# Patient Record
Sex: FEMALE | Race: WHITE | NOT HISPANIC OR LATINO | Employment: FULL TIME | URBAN - METROPOLITAN AREA
[De-identification: names, ages, dates, MRNs, and addresses within clinical notes are randomized per-mention and may not be internally consistent; named-entity substitution may affect disease eponyms.]

---

## 2018-10-10 ENCOUNTER — TELEPHONE (OUTPATIENT)
Dept: OBSTETRICS AND GYNECOLOGY | Facility: CLINIC | Age: 38
End: 2018-10-10

## 2018-10-10 DIAGNOSIS — Z12.39 BREAST CANCER SCREENING: Primary | ICD-10-CM

## 2018-10-10 NOTE — TELEPHONE ENCOUNTER
----- Message from Danelle Paige MA sent at 10/10/2018  9:47 AM CDT -----  Contact: Self  Lilly Ambrose  MRN: [id number]  Home Phone      [phone]  Work Phone      Not on file.  Mobile          [phone]    Patient Care Team:  Ezio Delgadillo MD as PCP - General (Family Medicine)  OB? No  What phone number can you be reached at? [phone]  Message: Please place and link mammogram orders to appointment scheduled on 12/7/2018.

## 2018-12-21 ENCOUNTER — HOSPITAL ENCOUNTER (OUTPATIENT)
Dept: RADIOLOGY | Facility: HOSPITAL | Age: 38
Discharge: HOME | End: 2018-12-21
Attending: OBSTETRICS & GYNECOLOGY
Payer: COMMERCIAL

## 2018-12-21 VITALS — WEIGHT: 182 LBS | BODY MASS INDEX: 26.96 KG/M2 | HEIGHT: 69 IN

## 2018-12-21 DIAGNOSIS — Z12.39 BREAST CANCER SCREENING: ICD-10-CM

## 2018-12-21 PROCEDURE — 77063 BREAST TOMOSYNTHESIS BI: CPT | Mod: TC

## 2018-12-21 PROCEDURE — 77067 SCR MAMMO BI INCL CAD: CPT | Mod: 26,,, | Performed by: RADIOLOGY

## 2018-12-21 PROCEDURE — 77063 BREAST TOMOSYNTHESIS BI: CPT | Mod: 26,,, | Performed by: RADIOLOGY

## 2018-12-21 PROCEDURE — 77067 SCR MAMMO BI INCL CAD: CPT | Mod: TC

## 2018-12-31 ENCOUNTER — OFFICE VISIT (OUTPATIENT)
Dept: OBSTETRICS AND GYNECOLOGY | Facility: CLINIC | Age: 38
End: 2018-12-31
Payer: COMMERCIAL

## 2018-12-31 VITALS
RESPIRATION RATE: 10 BRPM | DIASTOLIC BLOOD PRESSURE: 90 MMHG | WEIGHT: 202 LBS | HEART RATE: 68 BPM | BODY MASS INDEX: 29.92 KG/M2 | HEIGHT: 69 IN | SYSTOLIC BLOOD PRESSURE: 130 MMHG

## 2018-12-31 DIAGNOSIS — Z01.419 WELL WOMAN EXAM WITH ROUTINE GYNECOLOGICAL EXAM: Primary | ICD-10-CM

## 2018-12-31 PROCEDURE — 99396 PREV VISIT EST AGE 40-64: CPT | Mod: S$GLB,,, | Performed by: OBSTETRICS & GYNECOLOGY

## 2018-12-31 PROCEDURE — 99999 PR PBB SHADOW E&M-EST. PATIENT-LVL III: CPT | Mod: PBBFAC,,, | Performed by: OBSTETRICS & GYNECOLOGY

## 2018-12-31 RX ORDER — LOSARTAN POTASSIUM AND HYDROCHLOROTHIAZIDE 12.5; 5 MG/1; MG/1
1 TABLET ORAL 2 TIMES DAILY
COMMUNITY
Start: 2018-12-12

## 2018-12-31 RX ORDER — LANSOPRAZOLE 30 MG/1
CAPSULE, DELAYED RELEASE ORAL
COMMUNITY
Start: 2018-12-28 | End: 2023-08-16

## 2018-12-31 RX ORDER — ESCITALOPRAM OXALATE 10 MG/1
10 TABLET ORAL DAILY
COMMUNITY
Start: 2018-12-12

## 2018-12-31 NOTE — PROGRESS NOTES
Subjective:    Patient ID: Lilly Ambrose is a 38 y.o. female.     Chief Complaint: Annual Well Woman Exam     History of Present Illness:  Lilly presents today for Annual Well Woman exam. .Patient's last menstrual period was 2010.. She is currently using no hormone replacement therapy and she reports no problems with hot flashes, night sweats, irritability and vaginal dryness. She denies breast tenderness, masses, nipple discharge.  She reports no problems with urination. Bowel movements have not significantly changed.    Menstrual History:   Patient's last menstrual period was 2010.    OB History      Para Term  AB Living    2 2 2     2    SAB TAB Ectopic Multiple Live Births                       Review of Systems   Constitutional: Negative for activity change, appetite change, chills, diaphoresis, fatigue, fever and unexpected weight change.   HENT: Negative for mouth sores and tinnitus.    Eyes: Negative for discharge and visual disturbance.   Respiratory: Negative for cough, shortness of breath and wheezing.    Cardiovascular: Negative for chest pain, palpitations and leg swelling.   Gastrointestinal: Negative for abdominal pain, blood in stool, constipation, diarrhea, nausea and vomiting.   Endocrine: Negative for diabetes, hair loss, hot flashes, hyperthyroidism and hypothyroidism.   Genitourinary: Negative for decreased libido, dyspareunia, dysuria, flank pain, frequency, genital sores, hematuria, menorrhagia, menstrual problem, pelvic pain, urgency, vaginal bleeding, vaginal discharge, vaginal pain, urinary incontinence, postcoital bleeding and vaginal odor.   Musculoskeletal: Negative for back pain, joint swelling and myalgias.   Neurological: Negative for seizures, syncope, numbness and headaches.   Hematological: Negative for adenopathy. Does not bruise/bleed easily.   Psychiatric/Behavioral: Negative for sleep disturbance. The patient is not nervous/anxious.    Breast:  Negative for mastodynia and nipple discharge        Objective:    Vital Signs:  Vitals:    12/31/18 0803   BP: (!) 130/90   Pulse: 68   Resp: 10       Physical Exam:  General:  alert,normal appearing gravid female   Skin:  Skin color, texture, turgor normal. No rashes or lesions   HEENT:  conjunctivae/corneas clear. PERRL.   Neck: supple, trachea midline, no adenopathy or thyromegally   Respiratory:  clear to auscultation bilaterally   Heart:  regular rate and rhythm, S1, S2 normal, no murmur, click, rub or gallop   Breasts:   Nipples are protruding and have no nipple discharge. No palpable masses, erythema, skin changes, tenderness, or adenopathy.   Abdomen:  soft, non-tender. Bowel sounds normal. No masses,  no organomegaly   Pelvis: External genitalia: normal general appearance  Urinary system: urethral meatus normal, bladder nontender  Vaginal: normal mucosa without prolapse or lesions  Cervix: removed surgically  Uterus: removed surgically  Adnexa: removed surgically   Extremities: Normal ROM; no edema, no cyanosis   Neurologial: Normal strength and tone. No focal numbness or weakness. Reflexes 2+ and equal.   Psychiatric: normal mood, speech, dress, and thought processes         Assessment:      1. Well woman exam with routine gynecological exam          Plan:      Well woman exam with routine gynecological exam        COUNSELING:  Lilly was counseled on A.C.O.G. Pap guidelines and recommendations for yearly pelvic exams in addition to recommendations for yearly mammograms and monthly self breast exams. In addition she was counseled on adequate intake of calcium and vitamin D; to see her PCP for other health maintenance.

## 2021-07-13 ENCOUNTER — TELEPHONE (OUTPATIENT)
Dept: OBSTETRICS AND GYNECOLOGY | Facility: CLINIC | Age: 41
End: 2021-07-13

## 2021-07-13 DIAGNOSIS — Z12.31 BREAST CANCER SCREENING BY MAMMOGRAM: Primary | ICD-10-CM

## 2023-07-14 ENCOUNTER — TELEPHONE (OUTPATIENT)
Dept: OBSTETRICS AND GYNECOLOGY | Facility: CLINIC | Age: 43
End: 2023-07-14
Payer: COMMERCIAL

## 2023-07-14 DIAGNOSIS — Z12.31 BREAST CANCER SCREENING BY MAMMOGRAM: Primary | ICD-10-CM

## 2023-07-14 NOTE — TELEPHONE ENCOUNTER
Patient contacted office requesting and appointment with Dr. Marie for annual, and need mammogram scheduled. Patient is scheduled for 09/01/2023 @12:15 for her annual and mammo is scheduled for 09/01/2023 @11:25am. Patient verbally understood and orders are placed/linked.

## 2023-08-16 ENCOUNTER — OFFICE VISIT (OUTPATIENT)
Dept: GASTROENTEROLOGY | Facility: CLINIC | Age: 43
End: 2023-08-16
Payer: COMMERCIAL

## 2023-08-16 VITALS
DIASTOLIC BLOOD PRESSURE: 80 MMHG | HEIGHT: 69 IN | HEART RATE: 88 BPM | BODY MASS INDEX: 34.26 KG/M2 | OXYGEN SATURATION: 98 % | WEIGHT: 231.31 LBS | SYSTOLIC BLOOD PRESSURE: 120 MMHG

## 2023-08-16 DIAGNOSIS — Z12.11 SCREEN FOR COLON CANCER: ICD-10-CM

## 2023-08-16 DIAGNOSIS — K21.9 GASTROESOPHAGEAL REFLUX DISEASE, UNSPECIFIED WHETHER ESOPHAGITIS PRESENT: Primary | ICD-10-CM

## 2023-08-16 DIAGNOSIS — R13.14 PHARYNGOESOPHAGEAL DYSPHAGIA: ICD-10-CM

## 2023-08-16 DIAGNOSIS — R19.7 DIARRHEA, UNSPECIFIED TYPE: ICD-10-CM

## 2023-08-16 PROCEDURE — 99204 PR OFFICE/OUTPT VISIT, NEW, LEVL IV, 45-59 MIN: ICD-10-PCS | Mod: S$GLB,,, | Performed by: INTERNAL MEDICINE

## 2023-08-16 PROCEDURE — 99999 PR PBB SHADOW E&M-EST. PATIENT-LVL IV: ICD-10-PCS | Mod: PBBFAC,,, | Performed by: INTERNAL MEDICINE

## 2023-08-16 PROCEDURE — 99999 PR PBB SHADOW E&M-EST. PATIENT-LVL IV: CPT | Mod: PBBFAC,,, | Performed by: INTERNAL MEDICINE

## 2023-08-16 PROCEDURE — 99204 OFFICE O/P NEW MOD 45 MIN: CPT | Mod: S$GLB,,, | Performed by: INTERNAL MEDICINE

## 2023-08-16 RX ORDER — OMEGA-3-ACID ETHYL ESTERS 1 G/1
2 CAPSULE, LIQUID FILLED ORAL
COMMUNITY
Start: 2023-05-16 | End: 2023-08-16 | Stop reason: SDUPTHER

## 2023-08-16 RX ORDER — SODIUM, POTASSIUM,MAG SULFATES 17.5-3.13G
1 SOLUTION, RECONSTITUTED, ORAL ORAL DAILY
Qty: 1 KIT | Refills: 0 | Status: SHIPPED | OUTPATIENT
Start: 2023-08-16 | End: 2023-08-18

## 2023-08-16 RX ORDER — OMEGA-3-ACID ETHYL ESTERS 1 G/1
2 CAPSULE, LIQUID FILLED ORAL 2 TIMES DAILY
COMMUNITY
Start: 2023-07-28

## 2023-08-16 RX ORDER — LOSARTAN POTASSIUM AND HYDROCHLOROTHIAZIDE 12.5; 5 MG/1; MG/1
1 TABLET ORAL 2 TIMES DAILY
COMMUNITY
Start: 2023-08-10 | End: 2023-08-16 | Stop reason: SDUPTHER

## 2023-08-16 RX ORDER — CHOLECALCIFEROL (VITAMIN D3) 50 MCG
1 TABLET ORAL DAILY
COMMUNITY

## 2023-08-16 RX ORDER — AMLODIPINE BESYLATE 10 MG/1
10 TABLET ORAL
COMMUNITY
Start: 2023-08-14

## 2023-08-16 RX ORDER — ROSUVASTATIN CALCIUM 20 MG/1
1 TABLET, COATED ORAL DAILY
COMMUNITY
Start: 2023-03-21 | End: 2023-08-16 | Stop reason: SDUPTHER

## 2023-08-16 RX ORDER — ESCITALOPRAM OXALATE 10 MG/1
1 TABLET ORAL DAILY
COMMUNITY
Start: 2023-05-10 | End: 2023-08-16 | Stop reason: SDUPTHER

## 2023-08-16 RX ORDER — PANTOPRAZOLE SODIUM 40 MG/1
40 TABLET, DELAYED RELEASE ORAL DAILY
Qty: 90 TABLET | Refills: 3 | Status: SHIPPED | OUTPATIENT
Start: 2023-08-16 | End: 2024-08-15

## 2023-08-16 RX ORDER — ROSUVASTATIN CALCIUM 20 MG/1
20 TABLET, COATED ORAL NIGHTLY
COMMUNITY
Start: 2023-08-14

## 2023-08-16 RX ORDER — AMLODIPINE BESYLATE 10 MG/1
1 TABLET ORAL DAILY
COMMUNITY
Start: 2023-08-10 | End: 2023-08-16 | Stop reason: SDUPTHER

## 2023-08-16 NOTE — PATIENT INSTRUCTIONS
SUPREP Instructions    PLEASE FOLLOW THESE INSTRUCTIONS NOT THE INSTRUCTIONS ON THE BOX    You are scheduled for a colonoscopy with Dr. Quezada on Friday, September 8 at [facility] located at [address] in [place].  Enter through the South Entrance #1 (by the ED).  Go and check-in at Same Day Surgery.      You will receive a call 1-2 days before your colonoscopy to tell you the time to arrive.  If you have not received a call by the day before your procedure, call the Endoscopy Lab at [phone].    To ensure that your test is accurate and complete, you MUST follow these instructions listed below.  If you have any questions, please call our office at [phone].  Plan on being at the hospital for your procedure for 3-4 hours.    1.  Follow a CLEAR LIQUID DIET for the entire day before your scheduled colonoscopy.  This means no solid food the entire day starting when you wake.  You may have as much of the clear liquids as you want throughout the day.   CLEAR LIQUID DIET:   - NO DAIRY   - You can have:  Coffee with sugar (no creamer), tea, water, soda, apple or white grape juice, chicken or beef broth/bouillon (no meat, noodles, or veggies), green/yellow popsicles, green/yellow Jell-O, lemonade.    2.  AT 5 pm the evening before your colonoscopy, POUR ONE (1) BOTTLE OF SUPREP INTO THE MIXING CONTAINER, PROVIDED INSIDE THE BOX.  ADD WATER TO THE LINE ON THE CONTAINER AND MIX IT WELL.  DRINK THE ENTIRE CONTAINER AND THEN DRINK TWO (2) MORE CONTAINERS OF WATER OVER THE NEXT 1 HOUR.  This is sometimes easier to drink if this solution is cold, so you can mix the solution 20 minutes ahead of time and place in the refrigerator prior to drinking.  You have to drink the solution within 30-45 minutes of mixing it.  Do NOT put this solution over ice.  It IS ok to drink with a straw.    3.  The endoscopy department will call you 1 day before your colonoscopy to tell you the exact time to arrive,  AND to tell you the exact time to drink the 2nd portion of your prep (which will be FIVE HOURS BEFORE YOUR ARRIVAL TIME).  At this time given to you, POUR ONE (1) BOTTLE OF SUPREP INTO THE MIXING CONTAINER, PROVIDED INSIDE THE BOX.  ADD WATER TO THE LINE ON THE CONTAINER AND MIX IT WELL.  DRINK THE ENTIRE CONTAINER AND THEN DRINK TWO (2) MORE CONTAINERS OF WATER OVER THE NEXT 1 HOUR.  This is sometimes easier to drink if this solution is cold, so you can mix the solution 20 minutes ahead of time and place in the refrigerator prior to drinking.  You have to drink the solution within 30-45 minutes of mixing it.  Do NOT put this solution over ice.  It IS ok to drink with a straw.  Once this is complete, you may not have ANYTHING else by mouth!    4.  You must have someone with you to DRIVE YOU HOME since you will be receiving IV sedation for the colonoscopy.    5.  It is ok to take MOST of your REGULAR MEDICATIONS  in the morning of your test with a SIP of water.  THE ONLY MEDS YOU NEED TO HOLD ARE YOUR DIABETES MEDICATIONS,  SOME BLOOD PRESSURE MEDS, AND BLOOD THINNERS IF OK'D BY YOUR DOCTOR.  Do NOT have anything else to eat or drink the morning of your colonoscopy.  It is ok to brush your teeth.    6.  If you are on blood thinners THAT YOU HAVE BEEN INSTRUCTED TO HOLD BY YOUR DOCTOR FOR THIS PROCEDURE, then do NOT take this the morning of your colonoscopy.  Do NOT stop these medications on your own, they must be approved to be held by your doctor.  Your colonoscopy can NOT be done if you are on these medications.  Examples of blood thinners include: Coumadin, Aggrenox, Plavix, Pradaxa, Reapro, Pletal, Xarelto, Ticagrelor, Brilinta, Eliquis, and high dose aspirin (325 mg).  You do not have to stop baby aspirin 81 mg.    7.  IF YOU ARE DIABETIC:  NO INSULIN OR ORAL MEDICATIONS THE MORNING OF THE COLONOSCOPY.  TAKE ONLY HALF THE DOSE OF YOUR INSULIN THE DAY BEFORE THE COLONOSCOPY.  DO NOT TAKE ANY ORAL DIABETIC  MEDICATIONS THE DAY BEFORE THE COLONOSCOPY.  IF YOU ARE AN INSULIN DEPENDENT DIABETIC WITH UNSTABLE BLOOD SUGARS, NOTIFY YOUR PRIMARY CARE PHYSICIAN FOR INSTRUCTIONS.

## 2023-08-19 NOTE — PROGRESS NOTES
"Subjective     Patient ID: Lilly Ambrose is a 43 y.o. female.    Chief Complaint: Heartburn, Other, and Diarrhea    44 yo F complains of GERD, dysphagia, and diarrhea.  She describes her GERD as being a burning sensation at the suprasternal notch only; she does not feel burning coming up from her stomach.  She has a globus sensation in that location and has a cough associated with this.  This usually only bothers her when eating and drinking, and water alone can cause symptoms, but it really occurs randomly with variable timing.  She has been taking Zegerid after the symptoms occur with moderate results, but the episodes still occur.  She complains of "diarrhea for years" that occurs 2-7 times per day, and she does not have days without BM.  She denies seeing blood.  The BM vary and the inconsistency seems random, is not dependent on diet.  She quit alcohol abuse one month ago ("cold turkey") and stools are now a little more formed at times but she still has bad days.  Her maternal grandmother  from CRC at age 93; her mom has not had polyps, her siblings do not have CRC but she is unsure if they have had polyps.      Review of Systems   Constitutional:  Negative for chills and fever.   HENT:  Positive for trouble swallowing.    Eyes:  Negative for photophobia and visual disturbance.   Respiratory:  Negative for shortness of breath and wheezing.    Cardiovascular:  Negative for chest pain, palpitations and leg swelling.   Gastrointestinal:  Positive for diarrhea and reflux. Negative for blood in stool.   Genitourinary:  Negative for dysuria and hematuria.   Musculoskeletal:  Negative for joint swelling and myalgias.   Integumentary:  Negative for color change and rash.   Neurological:  Negative for dizziness and speech difficulty.   Psychiatric/Behavioral:  Negative for confusion and hallucinations.       Objective   /80 (BP Location: Left arm, Patient Position: Sitting, BP Method: Large (Manual))   Pulse " "88   Ht 5' 9" (1.753 m)   Wt 104.9 kg (231 lb 4.8 oz)   LMP 08/21/2010   SpO2 98%   BMI 34.16 kg/m²     Physical Exam  Constitutional:       Appearance: Normal appearance. She is well-developed.   HENT:      Head: Normocephalic and atraumatic.   Eyes:      Extraocular Movements: Extraocular movements intact.      Pupils: Pupils are equal, round, and reactive to light.   Pulmonary:      Effort: Pulmonary effort is normal. No respiratory distress.   Abdominal:      General: There is no distension.      Palpations: Abdomen is soft.   Musculoskeletal:         General: No signs of injury. Normal range of motion.      Cervical back: Normal range of motion and neck supple.   Neurological:      General: No focal deficit present.      Mental Status: She is alert and oriented to person, place, and time.   Psychiatric:         Mood and Affect: Mood normal.         Behavior: Behavior normal.         Thought Content: Thought content normal.         Judgment: Judgment normal.        Assessment and Plan     1. Gastroesophageal reflux disease, unspecified whether esophagitis present  -     Case Request Endoscopy: EGD (ESOPHAGOGASTRODUODENOSCOPY), COLONOSCOPY  -     pantoprazole (PROTONIX) 40 MG tablet; Take 1 tablet (40 mg total) by mouth once daily.  Dispense: 90 tablet; Refill: 3    2. Diarrhea, unspecified type  -     X-Ray Abdomen Flat And Erect; Future; Expected date: 08/16/2023  -     Clostridium difficile EIA; Future; Expected date: 08/16/2023  -     Electrolyte and Osmolality Panel, Feces Random; Future; Expected date: 08/16/2023  -     Fecal fat, qualitative; Future; Expected date: 08/16/2023  -     Giardia / Cryptosporidum, EIA; Future; Expected date: 08/16/2023  -     Pancreatic elastase, fecal; Future; Expected date: 08/16/2023  -     WBC, Stool; Future; Expected date: 08/16/2023    3. Pharyngoesophageal dysphagia  -     pantoprazole (PROTONIX) 40 MG tablet; Take 1 tablet (40 mg total) by mouth once daily.  " Dispense: 90 tablet; Refill: 3  -     Fl Modified Barium Swallow Speech; Future; Expected date: 08/16/2023  -     SLP video swallow; Future; Expected date: 08/16/2023    4. Screen for colon cancer  -     Case Request Endoscopy: EGD (ESOPHAGOGASTRODUODENOSCOPY), COLONOSCOPY  -     sodium,potassium,mag sulfates (SUPREP BOWEL PREP KIT) 17.5-3.13-1.6 gram SolR; Take 177 mLs by mouth once daily. for 2 days  Dispense: 1 kit; Refill: 0

## 2023-08-21 ENCOUNTER — TELEPHONE (OUTPATIENT)
Dept: GASTROENTEROLOGY | Facility: CLINIC | Age: 43
End: 2023-08-21
Payer: COMMERCIAL

## 2023-08-21 NOTE — TELEPHONE ENCOUNTER
Contacted pt to inform about XRAY results. Informed pt no significant stool was noted. Continue taking pantoprazole and continue EGD and colonoscopy planned. Pt stated she turned in stool this morning. Informed pt I left a voicemail for her to get scheduled for her speech study. Informed pt someone will call her to get that scheduled. Pt had verbal understanding. No questions by pt.           ----- Message from Laura Quezada MD sent at 8/19/2023 10:38 AM CDT -----  No significant stool burden noted, continue with our plan from clinic.  Please call with results, she is not sure how to use portal

## 2023-09-01 ENCOUNTER — OFFICE VISIT (OUTPATIENT)
Dept: OBSTETRICS AND GYNECOLOGY | Facility: CLINIC | Age: 43
End: 2023-09-01
Payer: COMMERCIAL

## 2023-09-01 ENCOUNTER — HOSPITAL ENCOUNTER (OUTPATIENT)
Dept: RADIOLOGY | Facility: HOSPITAL | Age: 43
Discharge: HOME | End: 2023-09-01
Attending: OBSTETRICS & GYNECOLOGY
Payer: COMMERCIAL

## 2023-09-01 VITALS
RESPIRATION RATE: 18 BRPM | DIASTOLIC BLOOD PRESSURE: 82 MMHG | HEART RATE: 117 BPM | OXYGEN SATURATION: 98 % | SYSTOLIC BLOOD PRESSURE: 126 MMHG | BODY MASS INDEX: 34.85 KG/M2 | WEIGHT: 235.31 LBS | HEIGHT: 69 IN

## 2023-09-01 VITALS — BODY MASS INDEX: 34.21 KG/M2 | WEIGHT: 231 LBS | HEIGHT: 69 IN

## 2023-09-01 DIAGNOSIS — Z12.31 BREAST CANCER SCREENING BY MAMMOGRAM: ICD-10-CM

## 2023-09-01 DIAGNOSIS — Z01.419 WELL WOMAN EXAM WITH ROUTINE GYNECOLOGICAL EXAM: Primary | ICD-10-CM

## 2023-09-01 DIAGNOSIS — Z90.710 HISTORY OF HYSTERECTOMY: ICD-10-CM

## 2023-09-01 DIAGNOSIS — Z12.11 COLON CANCER SCREENING: ICD-10-CM

## 2023-09-01 PROCEDURE — 77067 MAMMO DIGITAL SCREENING BILAT WITH TOMO: ICD-10-PCS | Mod: 26,,, | Performed by: RADIOLOGY

## 2023-09-01 PROCEDURE — 99999 PR PBB SHADOW E&M-EST. PATIENT-LVL III: ICD-10-PCS | Mod: PBBFAC,,, | Performed by: OBSTETRICS & GYNECOLOGY

## 2023-09-01 PROCEDURE — 99386 PREV VISIT NEW AGE 40-64: CPT | Mod: S$GLB,,, | Performed by: OBSTETRICS & GYNECOLOGY

## 2023-09-01 PROCEDURE — 77063 MAMMO DIGITAL SCREENING BILAT WITH TOMO: ICD-10-PCS | Mod: 26,,, | Performed by: RADIOLOGY

## 2023-09-01 PROCEDURE — 99386 PR PREVENTIVE VISIT,NEW,40-64: ICD-10-PCS | Mod: S$GLB,,, | Performed by: OBSTETRICS & GYNECOLOGY

## 2023-09-01 PROCEDURE — 77063 BREAST TOMOSYNTHESIS BI: CPT | Mod: 26,,, | Performed by: RADIOLOGY

## 2023-09-01 PROCEDURE — 99999 PR PBB SHADOW E&M-EST. PATIENT-LVL III: CPT | Mod: PBBFAC,,, | Performed by: OBSTETRICS & GYNECOLOGY

## 2023-09-01 PROCEDURE — 77067 SCR MAMMO BI INCL CAD: CPT | Mod: TC

## 2023-09-01 PROCEDURE — 77067 SCR MAMMO BI INCL CAD: CPT | Mod: 26,,, | Performed by: RADIOLOGY

## 2023-09-01 NOTE — PROGRESS NOTES
Subjective:    Patient ID: Lilly Ambrose is a 43 y.o. y.o. female.     Chief Complaint: Annual Well Woman Exam     History of Present Illness:  Lilly presents today for Annual Well Woman exam. She describes her menses as  absent s/p hysterectomy  .She denies pelvic pain.  She denies breast tenderness, masses, nipple discharge. She denies difficulty with urination or bowel movements. She denies menopausal symptoms such as hotflashes, vaginal dryness, and night sweats. She denies bloating, early satiety, or weight changes. She is sexually active. Contraception is by no method.    The following portions of the patient's history were reviewed and updated as appropriate: allergies, current medications, past family history, past medical history, past social history, past surgical history and problem list.      Menstrual History:   Patient's last menstrual period was 2010..     OB History          2    Para   2    Term   2            AB        Living   2         SAB        IAB        Ectopic        Multiple        Live Births                     The following portions of the patient's history were reviewed and updated as appropriate: allergies, current medications, past family history, past medical history, past social history, past surgical history, and problem list.        ROS:     Review of Systems   Constitutional:  Negative for activity change, appetite change, chills, diaphoresis, fatigue, fever and unexpected weight change.   HENT:  Negative for mouth sores and tinnitus.    Eyes:  Negative for discharge and visual disturbance.   Respiratory:  Negative for cough, shortness of breath and wheezing.    Cardiovascular:  Negative for chest pain, palpitations and leg swelling.   Gastrointestinal:  Negative for abdominal pain, bloating, blood in stool, constipation, diarrhea, nausea and vomiting.   Endocrine: Negative for diabetes, hair loss, hot flashes, hyperthyroidism and hypothyroidism.  "  Genitourinary:  Negative for dysuria, flank pain, frequency, genital sores, hematuria, urgency, vaginal bleeding, vaginal discharge, vaginal pain, postcoital bleeding and vaginal odor.   Musculoskeletal:  Negative for back pain, joint swelling and myalgias.   Integumentary:  Negative for rash, acne, breast mass, nipple discharge and breast skin changes.   Neurological:  Negative for seizures, syncope, numbness and headaches.   Hematological:  Negative for adenopathy. Does not bruise/bleed easily.   Psychiatric/Behavioral:  Negative for depression and sleep disturbance. The patient is not nervous/anxious.    Breast: Negative for mass, mastodynia, nipple discharge and skin changes      Objective:    Vital Signs:  Vitals:    09/01/23 1203   BP: 126/82   Pulse: (!) 117   Resp: 18   SpO2: 98%   Weight: 106.8 kg (235 lb 5.5 oz)   Height: 5' 9" (1.753 m)         Physical Exam:  General:  alert, cooperative, appears stated age   Skin:  Skin color, texture, turgor normal. No rashes or lesions   HEENT:  conjunctivae/corneas clear. PERRL.   Neck: supple, trachea midline, no adenopathy or thyromegally   Respiratory:  clear to auscultation bilaterally   Heart:  regular rate and rhythm, S1, S2 normal, no murmur, click, rub or gallop   Breasts:  no discharge, erythema, or tenderness   Abdomen:  normal findings: bowel sounds normal, no masses palpable, no organomegaly and soft, non-tender   Pelvis: External genitalia: normal general appearance  Urinary system: urethral meatus normal, bladder nontender  Vaginal: normal mucosa without prolapse or lesions  Cervix: absent, removed surgically  Uterus: absent, removed surgically  Adnexa: non palpable   Extremities: Normal ROM; no edema, no cyanosis   Neurologial: Normal strength and tone. No focal numbness or weakness. Reflexes 2+ and equal.   Psychiatric: normal mood, speech, dress, and thought processes         Assessment:       Healthy female exam.     1. Well woman exam with " routine gynecological exam    2. Breast cancer screening by mammogram    3. History of hysterectomy          Plan:       Pap smear deferred; no longer indicated  MMG pending  Colonoscopy ordered  Discussed weight bearing exercise, calcium, and vitamin d for osteoporosis prevention  RTC in 1 year or prn     COUNSELING:  Lilly was counseled on STD pevention, use and side-effects of various contraceptive measures, A.C.O.G. Pap guidelines and recommendations for yearly pelvic exams in addition to recommendations for monthly self breast exams; to see her PCP for other health maintenance.

## 2023-09-15 ENCOUNTER — PATIENT MESSAGE (OUTPATIENT)
Dept: GASTROENTEROLOGY | Facility: CLINIC | Age: 43
End: 2023-09-15
Payer: COMMERCIAL

## 2023-09-15 ENCOUNTER — TELEPHONE (OUTPATIENT)
Dept: GASTROENTEROLOGY | Facility: CLINIC | Age: 43
End: 2023-09-15
Payer: COMMERCIAL

## 2023-09-15 NOTE — TELEPHONE ENCOUNTER
So she did an EGD which did not show any lesions in her esophagus.  The speech swallowing study will show whether things she eats/drinks are going the wrong way, into her airway.  Coughing with eating is something airway related, not esophagus related.  But that ST study will also show movement in the esophagus as well.    But it is totally up to her.  She does not have to get if too expensive or does not want it.

## 2023-09-15 NOTE — TELEPHONE ENCOUNTER
Sent pt a patient portal message regarding her EGD/Colonoscopy results.           ----- Message from Laura Quezada MD sent at 9/14/2023  2:14 PM CDT -----  EoE (-), HP (-), cont PPI.  She has reflux esophagitis on EGD therefore really needs the PPI.

## 2024-04-15 ENCOUNTER — PATIENT MESSAGE (OUTPATIENT)
Dept: GASTROENTEROLOGY | Facility: CLINIC | Age: 44
End: 2024-04-15
Payer: COMMERCIAL

## 2024-08-21 ENCOUNTER — TELEPHONE (OUTPATIENT)
Dept: OBSTETRICS AND GYNECOLOGY | Facility: CLINIC | Age: 44
End: 2024-08-21
Payer: COMMERCIAL

## 2024-08-21 DIAGNOSIS — Z12.31 BREAST CANCER SCREENING BY MAMMOGRAM: Primary | ICD-10-CM

## 2024-08-21 NOTE — TELEPHONE ENCOUNTER
----- Message from Patrizia Mora MA sent at 8/21/2024  9:52 AM CDT -----  Contact: self  Lilly Ambrose  MRN: [id number]  Home Phone      [phone]  Work Phone      Not on file.  Mobile          [phone]    Patient Care Team:  Sarah Aguayo NP as PCP - General (Family Medicine)  Kam Muniz MD as Obstetrician (Obstetrics)  Afshan Osullivan MD as Consulting Physician (Obstetrics and Gynecology)  OB? No  What phone number can you be reached at? [phone]  Message: Please link mammo orders to appt 9/4/24.

## 2024-09-04 ENCOUNTER — HOSPITAL ENCOUNTER (OUTPATIENT)
Dept: RADIOLOGY | Facility: HOSPITAL | Age: 44
Discharge: HOME | End: 2024-09-04
Attending: OBSTETRICS & GYNECOLOGY
Payer: COMMERCIAL

## 2024-09-04 ENCOUNTER — OFFICE VISIT (OUTPATIENT)
Dept: OBSTETRICS AND GYNECOLOGY | Facility: CLINIC | Age: 44
End: 2024-09-04
Payer: COMMERCIAL

## 2024-09-04 VITALS
WEIGHT: 248.38 LBS | HEIGHT: 65 IN | DIASTOLIC BLOOD PRESSURE: 62 MMHG | HEART RATE: 101 BPM | OXYGEN SATURATION: 99 % | SYSTOLIC BLOOD PRESSURE: 124 MMHG | RESPIRATION RATE: 16 BRPM | BODY MASS INDEX: 41.38 KG/M2

## 2024-09-04 VITALS — WEIGHT: 235 LBS | HEIGHT: 65 IN | BODY MASS INDEX: 39.15 KG/M2

## 2024-09-04 DIAGNOSIS — Z90.710 HISTORY OF HYSTERECTOMY: ICD-10-CM

## 2024-09-04 DIAGNOSIS — Z12.31 BREAST CANCER SCREENING BY MAMMOGRAM: ICD-10-CM

## 2024-09-04 DIAGNOSIS — Z01.419 WELL WOMAN EXAM WITH ROUTINE GYNECOLOGICAL EXAM: Primary | ICD-10-CM

## 2024-09-04 PROCEDURE — 99999 PR PBB SHADOW E&M-EST. PATIENT-LVL III: CPT | Mod: PBBFAC,,, | Performed by: OBSTETRICS & GYNECOLOGY

## 2024-09-04 PROCEDURE — 99396 PREV VISIT EST AGE 40-64: CPT | Mod: S$GLB,,, | Performed by: OBSTETRICS & GYNECOLOGY

## 2024-09-04 PROCEDURE — 77067 SCR MAMMO BI INCL CAD: CPT | Mod: 26,,, | Performed by: RADIOLOGY

## 2024-09-04 PROCEDURE — 77067 SCR MAMMO BI INCL CAD: CPT | Mod: TC

## 2024-09-04 PROCEDURE — 77063 BREAST TOMOSYNTHESIS BI: CPT | Mod: 26,,, | Performed by: RADIOLOGY

## 2024-09-04 RX ORDER — TIRZEPATIDE 2.5 MG/.5ML
2.5 INJECTION, SOLUTION SUBCUTANEOUS WEEKLY
COMMUNITY
Start: 2024-08-20

## 2024-09-04 NOTE — PROGRESS NOTES
Subjective:    Patient ID: Lilly Ambrose is a 44 y.o. y.o. female.     Chief Complaint: Annual Well Woman Exam     History of Present Illness:  Lilly presents today for Annual Well Woman exam. She describes her menses as  absent s/p hysterectomy  .She denies pelvic pain.  She denies breast tenderness, masses, nipple discharge. She denies difficulty with urination or bowel movements. She denies menopausal symptoms such as hotflashes, vaginal dryness, and night sweats. She denies bloating, early satiety, or weight changes. She is sexually active. Contraception is by no method.    The following portions of the patient's history were reviewed and updated as appropriate: allergies, current medications, past family history, past medical history, past social history, past surgical history and problem list.      Menstrual History:   Patient's last menstrual period was 2010..     OB History          2    Para   2    Term   2            AB        Living   2         SAB        IAB        Ectopic        Multiple        Live Births                     The following portions of the patient's history were reviewed and updated as appropriate: allergies, current medications, past family history, past medical history, past social history, past surgical history, and problem list.        ROS:     Review of Systems   Constitutional:  Negative for activity change, appetite change, chills, diaphoresis, fatigue, fever and unexpected weight change.   HENT:  Negative for mouth sores and tinnitus.    Eyes:  Negative for discharge and visual disturbance.   Respiratory:  Negative for cough, shortness of breath and wheezing.    Cardiovascular:  Negative for chest pain, palpitations and leg swelling.   Gastrointestinal:  Negative for abdominal pain, bloating, blood in stool, constipation, diarrhea, nausea and vomiting.   Endocrine: Negative for diabetes, hair loss, hot flashes, hyperthyroidism and hypothyroidism.  "  Genitourinary:  Negative for dysuria, flank pain, frequency, genital sores, hematuria, urgency, vaginal bleeding, vaginal discharge, vaginal pain, postcoital bleeding and vaginal odor.   Musculoskeletal:  Negative for back pain, joint swelling and myalgias.   Integumentary:  Negative for rash, acne, breast mass, nipple discharge and breast skin changes.   Neurological:  Negative for seizures, syncope, numbness and headaches.   Hematological:  Negative for adenopathy. Does not bruise/bleed easily.   Psychiatric/Behavioral:  Negative for depression and sleep disturbance. The patient is not nervous/anxious.    Breast: Negative for mass, mastodynia, nipple discharge and skin changes      Objective:    Vital Signs:  Vitals:    09/04/24 1612   BP: 124/62   BP Location: Left arm   Patient Position: Sitting   BP Method: Medium (Manual)   Pulse: 101   Resp: 16   SpO2: 99%   Weight: 112.6 kg (248 lb 5.6 oz)   Height: 5' 5" (1.651 m)         Physical Exam:  General:  alert, cooperative, appears stated age   Skin:  Skin color, texture, turgor normal. No rashes or lesions   HEENT:  conjunctivae/corneas clear. PERRL.   Neck: supple, trachea midline, no adenopathy or thyromegally   Respiratory:  clear to auscultation bilaterally   Heart:  regular rate and rhythm, S1, S2 normal, no murmur, click, rub or gallop   Breasts:  no discharge, erythema, or tenderness   Abdomen:  normal findings: bowel sounds normal, no masses palpable, no organomegaly and soft, non-tender   Pelvis: External genitalia: normal general appearance  Urinary system: urethral meatus normal, bladder nontender  Vaginal: normal mucosa without prolapse or lesions  Cervix: absent, removed surgically  Uterus: absent, removed surgically  Adnexa: non palpable   Extremities: Normal ROM; no edema, no cyanosis   Neurologial: Normal strength and tone. No focal numbness or weakness. Reflexes 2+ and equal.   Psychiatric: normal mood, speech, dress, and thought processes "         Assessment:       Healthy female exam.     1. Well woman exam with routine gynecological exam    2. Breast cancer screening by mammogram    3. History of hysterectomy          Plan:       Pap smear deferred; no longer indicated  MMG pending  Colonoscopy UTD  Discussed weight bearing exercise, calcium, and vitamin d for osteoporosis prevention  RTC in 1 year or prn     COUNSELING:  Lilly was counseled on STD pevention, use and side-effects of various contraceptive measures, A.C.O.G. Pap guidelines and recommendations for yearly pelvic exams in addition to recommendations for monthly self breast exams; to see her PCP for other health maintenance.